# Patient Record
(demographics unavailable — no encounter records)

---

## 2017-12-24 NOTE — ER DOCUMENT REPORT
HPI





- HPI


Patient complains to provider of: Left knee swelling


Onset: Other - 1 week ago


Onset/Duration: Sudden


Pain Level: 5


Context: 





31-year-old complaining of left knee swelling on Friday morning after falling 

heavy logs after cutting a tree on Thursday.  No specific pain and only feels 

tight wanted to know if there was fluid in the joint.  No fever.  No history of 

MRSA.


Exacerbated by: Denies


Relieved by: Denies


Similar symptoms previously: No


Recently seen / treated by doctor: No





- ROS


ROS below otherwise negative: Yes


Systems Reviewed and Negative: Yes All other systems reviewed and negative





Past Medical History





- General


Information source: Patient





- Social History


Smoking Status: Unknown if Ever Smoked


Frequency of alcohol use: None


Drug Abuse: None


Lives with: Family


Family History: Reviewed & Not Pertinent





- Medical History


Medical History: Negative - How is moving and happy


Surgical Hx: Negative





Vertical Provider Document





- CONSTITUTIONAL


Agree With Documented VS: Yes


Exam Limitations: No Limitations


General Appearance: No Apparent Distress





- INFECTION CONTROL


TRAVEL OUTSIDE OF THE [place] IN LAST 30 DAYS: No





- HEENT


HEENT: Normocephalic





- NECK


Neck: Supple





- RESPIRATORY


O2 Sat by Pulse Oximetry: 100





- MUSCULOSKELETAL/EXTREMETIES


Musculoskeletal/Extremeties: PAWAN FROM.  negative: Tender


Notes: 





mild left knee effusion by exam, no erythema, tenderness or warmth.





- NEURO


Level of Consciousness: Awake, Alert, Appropriate


Motor/Sensory: No Motor Deficit, No Sensory Deficit





- DERM


Integumentary: Warm, Dry, No Rash





Course





- Vital Signs


Vital signs: 


 











Temp Pulse Resp BP Pulse Ox


 


 97.8 F   76   16   114/73   100 


 


 12/24/17 08:03  12/24/17 08:03  12/24/17 08:03  12/24/17 08:03  12/24/17 08:03














Discharge





- Discharge


Clinical Impression: 


 minimal left knee effusion





Condition: Good


Disposition: HOME, SELF-CARE


Instructions:  Knee Effusion ([facility]), Use of Over-The-Counter Ibuprofen ([facility])


Additional Instructions: 


Take Motrin 600 mg 3 times a day for several days


Elevate


Knee compression with the knee spandex that you have


see orthopedics if persists


to er if increased size, fever, red, hot or any concerns


Referrals: 


ASHLIE RITCHIE MD [ACTIVE STAFF] - Follow up as needed